# Patient Record
(demographics unavailable — no encounter records)

---

## 2024-10-24 NOTE — PROCEDURE
[de-identified] : Ultrasound Guided Injection  Indication for ultrasound guidance: Ensure placement within the knee joint  Utlizing the [employer] portable ultrasound machine, the Linear 6cm 13-6 MHz transducer and sterile ultrasound gel, ultrasound guidance with the probe in the short axis, utilizing an in plane approach, was used for the following injection:   Injection: LEFT knee joint. Indication: Osteoarthritis.  A discussion was had with the patient regarding this procedure and all questions were answered. All risks, benefits and alternatives were discussed. These included but were not limited to bleeding, infection, allergic reaction and reaccumulation of fluid. A timeout was done to ensure correct side and pt agreed to the procedure.  Alcohol was used to clean the skin, and Chlorhexidine was used to sterilize and prep the area in the superolateral joint line aspect of the knee. A 25 -gauge needle was used to inject 3cc of 1% lidocaine without epinephrine.  A 22-gauge needle was used to inject 4cc of 1% lidocaine without epinephrine and 1cc of 40mg/ml methylprednisolone into the knee. A sterile bandage was then applied. The patient tolerated the procedure well.  Injection: RIGHT knee joint. Indication: Osteoarthritis.  A discussion was had with the patient regarding this procedure and all questions were answered. All risks, benefits and alternatives were discussed. These included but were not limited to bleeding, infection, allergic reaction and reaccumulation of fluid. A timeout was done to ensure correct side and pt agreed to the procedure.  Alcohol was used to clean the skin, and Chlorhexidine was used to sterilize and prep the area in the superolateral joint line aspect of the knee. A 25 -gauge needle was used to inject 3cc of 1% lidocaine without epinephrine.  A 22-gauge needle was used to inject 4cc of 1% lidocaine without epinephrine and 1cc of 40mg/ml methylprednisolone into the knee. A sterile bandage was then applied. The patient tolerated the procedure well.

## 2024-10-24 NOTE — PHYSICAL EXAM
[Slightly Antalgic] : slightly antalgic [de-identified] : Constitutional: Well-nourished, well-developed, No acute distress\par  Respiratory:  Good respiratory effort, no SOB\par  Lymphatic: No regional lymphadenopathy, no lymphedema\par  Psychiatric: Pleasant and normal affect, alert and oriented x3\par  Skin: Clean dry and intact B/L UE/LE\par  Musculoskeletal: normal except where as noted in regional exam\par  \par  Bilateral Knees:\par  APPEARANCE: no marked deformities, no swelling or malalignment\par  POSITIVE TENDERNESS:  medial joint line. \par  ROM: full & pain with flexion\par  SPECIAL TESTS: stable v/v stress. painless grind. neg Lachman's. neg ant/post drawer. pos Rosalva's. \par  NEURO: Normal sensation of LE, DTRs 2+/4 patella and achilles\par  PULSES: 2+ DP/PT pulses\par  B/L Hips: No asymmetry, malalignment, or swelling, Full ROM, 5/5 strength in flexion/ext, IR/ER, Abd/Add, Joints stable\par  B/L Ankles: No asymmetry, malalignment, or swelling, Full ROM, 5/5 strength in DF/PF/Inv/Ev, Joints stable

## 2024-10-24 NOTE — PHYSICAL EXAM
[Slightly Antalgic] : slightly antalgic [de-identified] : Constitutional: Well-nourished, well-developed, No acute distress\par  Respiratory:  Good respiratory effort, no SOB\par  Lymphatic: No regional lymphadenopathy, no lymphedema\par  Psychiatric: Pleasant and normal affect, alert and oriented x3\par  Skin: Clean dry and intact B/L UE/LE\par  Musculoskeletal: normal except where as noted in regional exam\par  \par  Bilateral Knees:\par  APPEARANCE: no marked deformities, no swelling or malalignment\par  POSITIVE TENDERNESS:  medial joint line. \par  ROM: full & pain with flexion\par  SPECIAL TESTS: stable v/v stress. painless grind. neg Lachman's. neg ant/post drawer. pos Rosalva's. \par  NEURO: Normal sensation of LE, DTRs 2+/4 patella and achilles\par  PULSES: 2+ DP/PT pulses\par  B/L Hips: No asymmetry, malalignment, or swelling, Full ROM, 5/5 strength in flexion/ext, IR/ER, Abd/Add, Joints stable\par  B/L Ankles: No asymmetry, malalignment, or swelling, Full ROM, 5/5 strength in DF/PF/Inv/Ev, Joints stable

## 2024-10-24 NOTE — HISTORY OF PRESENT ILLNESS
[de-identified] : JEAN MOORE  is a 56 year old M who presents for follow up visit with bilateral knee pain. Patient was last seen November 2023, at which time he had bilateral knee zilretta injections. Patient stated that the Zilretta injections helped for at least 4 months and then the pain slowly returned. Pain is primarily located at the anterior knees. It began in 2019, without injury or trauma.  Pain is described as sharp and achy in nature, worse with getting up from kneeing. Endorses occasional buckling.  Patient had bilateral steroid injections July 2023 with good relief for about 3 months.  No prior injury or recent injury. Denies numbness, tingling, weakness of the lower extremities.

## 2024-10-24 NOTE — DISCUSSION/SUMMARY
[de-identified] : Leoncio presents for follow up of bilateral osteoarthritis. Ultrasound guided bilateral cortisone injections performed today. Patient tolerated the procedure well. I informed the patient that the numbing medicine in today's injection will last for about 4-6 hours. The steroid that was injected will start to work in 1 to 2 days, peak at 1-2 weeks, and may last up to 4-6 weeks.  Pain may worsen over the next 72 hours. Patient was also informed they may notice local skin depigmentation, which is normal.   Zilretta injection for bilateral knees ordered today. Patient will follow up for this once approved.   Sweta Huerta MD, [facility] Department of Orthopedics   I Iam Prince ATC, assisted in the history and documentation of the patient with Dr Sweta Huerta on 10/22/2024 ISweta MD, [facility], personally performed the services described in the documentation, reviewed the documentation recorded by the scribe in my presence and it accurately and completely records my words and actions.

## 2024-10-24 NOTE — DISCUSSION/SUMMARY
[de-identified] : Leoncio presents for follow up of bilateral osteoarthritis. Ultrasound guided bilateral cortisone injections performed today. Patient tolerated the procedure well. I informed the patient that the numbing medicine in today's injection will last for about 4-6 hours. The steroid that was injected will start to work in 1 to 2 days, peak at 1-2 weeks, and may last up to 4-6 weeks.  Pain may worsen over the next 72 hours. Patient was also informed they may notice local skin depigmentation, which is normal.   Zilretta injection for bilateral knees ordered today. Patient will follow up for this once approved.   Sweta Huerta MD, [facility] Department of Orthopedics   I Iam Prince ATC, assisted in the history and documentation of the patient with Dr Sweta Huerta on 10/22/2024 ISweta MD, [facility], personally performed the services described in the documentation, reviewed the documentation recorded by the scribe in my presence and it accurately and completely records my words and actions.

## 2024-10-24 NOTE — PROCEDURE
[de-identified] : Ultrasound Guided Injection  Indication for ultrasound guidance: Ensure placement within the knee joint  Utlizing the [employer] portable ultrasound machine, the Linear 6cm 13-6 MHz transducer and sterile ultrasound gel, ultrasound guidance with the probe in the short axis, utilizing an in plane approach, was used for the following injection:   Injection: LEFT knee joint. Indication: Osteoarthritis.  A discussion was had with the patient regarding this procedure and all questions were answered. All risks, benefits and alternatives were discussed. These included but were not limited to bleeding, infection, allergic reaction and reaccumulation of fluid. A timeout was done to ensure correct side and pt agreed to the procedure.  Alcohol was used to clean the skin, and Chlorhexidine was used to sterilize and prep the area in the superolateral joint line aspect of the knee. A 25 -gauge needle was used to inject 3cc of 1% lidocaine without epinephrine.  A 22-gauge needle was used to inject 4cc of 1% lidocaine without epinephrine and 1cc of 40mg/ml methylprednisolone into the knee. A sterile bandage was then applied. The patient tolerated the procedure well.  Injection: RIGHT knee joint. Indication: Osteoarthritis.  A discussion was had with the patient regarding this procedure and all questions were answered. All risks, benefits and alternatives were discussed. These included but were not limited to bleeding, infection, allergic reaction and reaccumulation of fluid. A timeout was done to ensure correct side and pt agreed to the procedure.  Alcohol was used to clean the skin, and Chlorhexidine was used to sterilize and prep the area in the superolateral joint line aspect of the knee. A 25 -gauge needle was used to inject 3cc of 1% lidocaine without epinephrine.  A 22-gauge needle was used to inject 4cc of 1% lidocaine without epinephrine and 1cc of 40mg/ml methylprednisolone into the knee. A sterile bandage was then applied. The patient tolerated the procedure well.

## 2024-10-24 NOTE — HISTORY OF PRESENT ILLNESS
[de-identified] : JEAN MOORE  is a 56 year old M who presents for follow up visit with bilateral knee pain. Patient was last seen November 2023, at which time he had bilateral knee zilretta injections. Patient stated that the Zilretta injections helped for at least 4 months and then the pain slowly returned. Pain is primarily located at the anterior knees. It began in 2019, without injury or trauma.  Pain is described as sharp and achy in nature, worse with getting up from kneeing. Endorses occasional buckling.  Patient had bilateral steroid injections July 2023 with good relief for about 3 months.  No prior injury or recent injury. Denies numbness, tingling, weakness of the lower extremities.

## 2025-03-24 NOTE — PHYSICAL EXAM
[Normal Appearance] : normal appearance [] : no respiratory distress [Prostate Tenderness] : the prostate was not tender [No Prostate Nodules] : no prostate nodules [Prostate Size ___ gm] : prostate size [unfilled] gm [Normal Station and Gait] : the gait and station were normal for the patient's age [Oriented To Time, Place, And Person] : oriented to person, place, and time

## 2025-03-25 NOTE — HISTORY OF PRESENT ILLNESS
[de-identified] : 56 year old male with hx of hepatic steatosis and chronic LLQ pain presenting for a CPE. He states that he has has been having a discomfort after he urinates, but denies pain and burning. He states that the frequency has gone up, with him having to wake up in the middle of the night to urinate. Denies fever, chills, chest pain, SOB. No change in color of his urine and without burning or blood. He mentions that he has saw urology in the past but is unaware if he should be taking any medications. Currently he does not take anything. He states that he tends to overthink a lot and has trouble sleeping. Denies SI/HI but would like to talk to someone about his stressors. He also has been dealing with chronic knee pain which he follows with ortho.

## 2025-03-25 NOTE — ASSESSMENT
[FreeTextEntry1] : 56 year old male with hx of hepatic steatosis and chronic LLQ pain presenting for a CPE.   #hepatic steatosis  Diagnosed in 2014. Has been evaluated at that time due to elevated LFTs. Imaging demonstrated fatty liver. Since then, he has lost weight.  - will check CMP for liver function tests, has been negative recently  - encouraged weight loss and increasing exercise  - will send basic workup and U/S repeat   #dysuria  Was on flomax for BPH but has ? since been discontinued by patient.  - f/u urology  - urine studies to r/o UTI   #medial meniscal tear  Follows with ortho. Received steroid injections but hyaluronic acid injections denied by insurance.  - to follow-up   #HCM  - dental referral  - routine labs   Case discussed with Dr. Robbins. RTC in 3 months.

## 2025-03-25 NOTE — HEALTH RISK ASSESSMENT
[Good] : ~his/her~  mood as  good [Yes] : Yes [Monthly or less (1 pt)] : Monthly or less (1 point) [1 or 2 (0 pts)] : 1 or 2 (0 points) [No falls in past year] : Patient reported no falls in the past year [Never (0 pts)] : Never (0 points) [Little interest or pleasure doing things] : 1) Little interest or pleasure doing things [Feeling down, depressed, or hopeless] : 2) Feeling down, depressed, or hopeless [2] : 2) Feeling down, depressed, or hopeless for more than half of the days (2) [PHQ-2 Positive] : PHQ-2 Positive [PHQ-9 Positive] : PHQ-9 Positive [Never] : Never [0-4] : 0-4 [NO] : No [Financial] : financial [With Family] : lives with family [Employed] : employed [] :  [Sexually Active] : sexually active [Fully functional (bathing, dressing, toileting, transferring, walking, feeding)] : Fully functional (bathing, dressing, toileting, transferring, walking, feeding) [Feels Safe at Home] : Feels safe at home [Fully functional (using the telephone, shopping, preparing meals, housekeeping, doing laundry, using] : Fully functional and needs no help or supervision to perform IADLs (using the telephone, shopping, preparing meals, housekeeping, doing laundry, using transportation, managing medications and managing finances) [Safety elements used in home] : safety elements used in home [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, works in construction  [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, typical cultural foods, no fast food or sugary beverages  [[id number]] : 4 [Change in mental status noted] : No change in mental status noted [Language] : denies difficulty with language [Behavior] : denies difficulty with behavior [Learning/Retaining New Information] : denies difficulty learning/retaining new information [Handling Complex Tasks] : denies difficulty handling complex tasks [Reasoning] : denies difficulty with reasoning [Spatial Ability and Orientation] : denies difficulty with spatial ability and orientation [High Risk Behavior] : no high risk behavior [Reports changes in hearing] : Reports no changes in hearing [Reports changes in vision] : Reports no changes in vision [Reports changes in dental health] : Reports no changes in dental health

## 2025-03-25 NOTE — HEALTH RISK ASSESSMENT
[Good] : ~his/her~  mood as  good [Yes] : Yes [Monthly or less (1 pt)] : Monthly or less (1 point) [1 or 2 (0 pts)] : 1 or 2 (0 points) [Never (0 pts)] : Never (0 points) [No falls in past year] : Patient reported no falls in the past year [Little interest or pleasure doing things] : 1) Little interest or pleasure doing things [Feeling down, depressed, or hopeless] : 2) Feeling down, depressed, or hopeless [2] : 2) Feeling down, depressed, or hopeless for more than half of the days (2) [PHQ-2 Positive] : PHQ-2 Positive [PHQ-9 Positive] : PHQ-9 Positive [Never] : Never [0-4] : 0-4 [NO] : No [Financial] : financial [With Family] : lives with family [Employed] : employed [] :  [Sexually Active] : sexually active [Feels Safe at Home] : Feels safe at home [Fully functional (bathing, dressing, toileting, transferring, walking, feeding)] : Fully functional (bathing, dressing, toileting, transferring, walking, feeding) [Fully functional (using the telephone, shopping, preparing meals, housekeeping, doing laundry, using] : Fully functional and needs no help or supervision to perform IADLs (using the telephone, shopping, preparing meals, housekeeping, doing laundry, using transportation, managing medications and managing finances) [Safety elements used in home] : safety elements used in home [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, works in construction  [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, typical cultural foods, no fast food or sugary beverages  [[id number]] : 4 [Change in mental status noted] : No change in mental status noted [Language] : denies difficulty with language [Behavior] : denies difficulty with behavior [Learning/Retaining New Information] : denies difficulty learning/retaining new information [Handling Complex Tasks] : denies difficulty handling complex tasks [Reasoning] : denies difficulty with reasoning [Spatial Ability and Orientation] : denies difficulty with spatial ability and orientation [High Risk Behavior] : no high risk behavior [Reports changes in hearing] : Reports no changes in hearing [Reports changes in vision] : Reports no changes in vision [Reports changes in dental health] : Reports no changes in dental health

## 2025-03-25 NOTE — HISTORY OF PRESENT ILLNESS
[de-identified] : 56 year old male with hx of hepatic steatosis and chronic LLQ pain presenting for a CPE. He states that he has has been having a discomfort after he urinates, but denies pain and burning. He states that the frequency has gone up, with him having to wake up in the middle of the night to urinate. Denies fever, chills, chest pain, SOB. No change in color of his urine and without burning or blood. He mentions that he has saw urology in the past but is unaware if he should be taking any medications. Currently he does not take anything. He states that he tends to overthink a lot and has trouble sleeping. Denies SI/HI but would like to talk to someone about his stressors. He also has been dealing with chronic knee pain which he follows with ortho.

## 2025-03-25 NOTE — HEALTH RISK ASSESSMENT
[Good] : ~his/her~  mood as  good [Yes] : Yes [Monthly or less (1 pt)] : Monthly or less (1 point) [1 or 2 (0 pts)] : 1 or 2 (0 points) [Never (0 pts)] : Never (0 points) [No falls in past year] : Patient reported no falls in the past year [Little interest or pleasure doing things] : 1) Little interest or pleasure doing things [Feeling down, depressed, or hopeless] : 2) Feeling down, depressed, or hopeless [2] : 2) Feeling down, depressed, or hopeless for more than half of the days (2) [PHQ-2 Positive] : PHQ-2 Positive [PHQ-9 Positive] : PHQ-9 Positive [Never] : Never [0-4] : 0-4 [NO] : No [Financial] : financial [With Family] : lives with family [Employed] : employed [] :  [Sexually Active] : sexually active [Fully functional (bathing, dressing, toileting, transferring, walking, feeding)] : Fully functional (bathing, dressing, toileting, transferring, walking, feeding) [Feels Safe at Home] : Feels safe at home [Fully functional (using the telephone, shopping, preparing meals, housekeeping, doing laundry, using] : Fully functional and needs no help or supervision to perform IADLs (using the telephone, shopping, preparing meals, housekeeping, doing laundry, using transportation, managing medications and managing finances) [Safety elements used in home] : safety elements used in home [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, works in construction  [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, typical cultural foods, no fast food or sugary beverages  [[id number]] : 4 [Change in mental status noted] : No change in mental status noted [Language] : denies difficulty with language [Behavior] : denies difficulty with behavior [Learning/Retaining New Information] : denies difficulty learning/retaining new information [Handling Complex Tasks] : denies difficulty handling complex tasks [Reasoning] : denies difficulty with reasoning [Spatial Ability and Orientation] : denies difficulty with spatial ability and orientation [High Risk Behavior] : no high risk behavior [Reports changes in hearing] : Reports no changes in hearing [Reports changes in vision] : Reports no changes in vision [Reports changes in dental health] : Reports no changes in dental health

## 2025-03-25 NOTE — HISTORY OF PRESENT ILLNESS
[de-identified] : 56 year old male with hx of hepatic steatosis and chronic LLQ pain presenting for a CPE. He states that he has has been having a discomfort after he urinates, but denies pain and burning. He states that the frequency has gone up, with him having to wake up in the middle of the night to urinate. Denies fever, chills, chest pain, SOB. No change in color of his urine and without burning or blood. He mentions that he has saw urology in the past but is unaware if he should be taking any medications. Currently he does not take anything. He states that he tends to overthink a lot and has trouble sleeping. Denies SI/HI but would like to talk to someone about his stressors. He also has been dealing with chronic knee pain which he follows with ortho.

## 2025-03-25 NOTE — HISTORY OF PRESENT ILLNESS
[FreeTextEntry1] : 56 year old male presents to the office for a c/o nocturia and tingling sensation after urination. He states that the tingling sensation happens when he holds his urine but urinates well otherwise Denies frequency, gross hematuria, dysuria, straining, dribbling flank pain  LUTS Nocturia x1   Patient reports that erections are not fully rigid

## 2025-05-07 NOTE — HISTORY OF PRESENT ILLNESS
[FreeTextEntry1] : Patient returns to discuss his hormonal laboratory studiesHe is doing well on the tadalafil without problems with it.  He feels it is effective.

## 2025-05-13 NOTE — PHYSICAL EXAM
[Normal Appearance] : normal appearance [] : no respiratory distress [Normal Station and Gait] : the gait and station were normal for the patient's age [Oriented To Time, Place, And Person] : oriented to person, place, and time [TextEntry] : perimetry negative

## 2025-05-21 NOTE — HISTORY OF PRESENT ILLNESS
[FreeTextEntry1] : Patient returns to the officeIt was also elevated on repeat.  He has an MRI which showed a small hypodensity near the pituitary stalk.  There are no lesions that would appear to impinge upon the optic nerve.  He was given referral for the endocrinologist at last visit but was told that it was booked out till December. for elevated prolactin 19.3

## 2025-05-21 NOTE — PHYSICAL EXAM
[General Appearance - Well Developed] : well developed [General Appearance - Well Nourished] : well nourished [No Focal Deficits] : no focal deficits

## 2025-06-01 NOTE — INTERPRETER SERVICES
[Patient Declined  Services] : - None: Patient declined  services [Interpreters_Relationshiptopatient] : He prefers use English by self. [TWNoteComboBox1] : Slovenian

## 2025-06-01 NOTE — REVIEW OF SYSTEMS
[FreeTextEntry2] : Constitutional:  no fever and no chills.  Eyes:  no discharge.  HEENT:  no earache.  Cardiovascular:  no chest pain, no palpitations and no lower extremity edema.  Respiratory:  no shortness of breath, no wheezing and no cough.  Gastrointestinal:  no abdominal pain, no nausea and no vomiting.  Genitourinary:  no dysuria.  Musculoskeletal:  no joint pain.  Integumentary:  no itching.  Neurological:  no headache.  Psychiatric:  not suicidal.  Hematologic/Lymphatic:  no easy bleeding. [FreeTextEntry3] : see hpi

## 2025-06-01 NOTE — ASSESSMENT
[FreeTextEntry1] : Mr. JEAN MOORE is a 57 year old Other race  male  with history of hepatic steatosis and chronic LLQ pain  presented today for " something in the eyes".  # left upper eyelid hordeolum Instructed pt not to pop the stye/hordeolum. Encouraged Apply frequent warm compression to the eye. Explained no medical indication for ABT ointment, but he was adamant to get ABT eye medications. Sent Rx for erythromycin ophthal ointment qid for 7days.  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 JOSHUA MUIR,CHERYL GROSSMAN   ophthal.  # Increased prolactin and abnormal brain MRI -serum prolactin : 23.8 on 3/25/25 ---> 19.3 5/5/25. ref ( 4.1-18.4). Explained pt that prolactin increase was mild and down trending. Pt denies sx of gynecomastia or male ED. -Reviewed MR pituitary W/WO cont on 5/20/25 by uro: Evidence of a 2 mm hypoenhancing nodule along the anterior margin of the infundibulum; a finding compatible with adenoma in the appropriate clinical-laboratory setting. No pituitary enlargement or suprasellar abnormality. -Gave pt [facility]  Patient Access Services. TEL) [phone] [facility]  RTO with our clinic prn.

## 2025-06-01 NOTE — PHYSICAL EXAM
[Normal Sclera/Conjunctiva] : normal sclera/conjunctiva [PERRL] : pupils equal round and reactive to light [EOMI] : extraocular movements intact [de-identified] : WDWN in NAD HEENT:  unremarkable Neck:  supple, no JVD, no LN Lungs:  CTA B/L, no W/R/R Heart:  Reg rate, +S1S2, no M/R/G Abdomen:  soft, NT, ND, +BS, no masses, no HS-megaly Genital: No pubic or genital lesions noted. Ext:  no C/C/E Neuro:  no focal deficits [de-identified] : Rt upper eyelid small sized hordeolum noted with mild erythema

## 2025-06-01 NOTE — PHYSICAL EXAM
[PERRL] : pupils equal round and reactive to light [Normal Sclera/Conjunctiva] : normal sclera/conjunctiva [EOMI] : extraocular movements intact [de-identified] : WDWN in NAD HEENT:  unremarkable Neck:  supple, no JVD, no LN Lungs:  CTA B/L, no W/R/R Heart:  Reg rate, +S1S2, no M/R/G Abdomen:  soft, NT, ND, +BS, no masses, no HS-megaly Genital: No pubic or genital lesions noted. Ext:  no C/C/E Neuro:  no focal deficits [de-identified] : Rt upper eyelid small sized hordeolum noted with mild erythema

## 2025-06-01 NOTE — ASSESSMENT
[FreeTextEntry1] : Mr. JEAN MOORE is a 57 year old Other race  male  with history of hepatic steatosis and chronic LLQ pain  presented today for " something in the eyes".  # left upper eyelid hordeolum Instructed pt not to pop the stye/hordeolum. Encouraged Apply frequent warm compression to the eye. Explained no medical indication for ABT ointment, but he was adamant to get ABT eye medications. Sent Rx for erythromycin ophthal ointment qid for 7days.  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 JOSHUA MUIR,CHERYL GROSSMAN   ophthal.  # Increased prolactin and abnormal brain MRI -serum prolactin : 23.8 on 3/25/25 ---> 19.3 5/5/25. ref ( 4.1-18.4). Explained pt that prolactin increase was mild and down trending. Pt denies sx of gynecomastia or male ED. -Reviewed MR pituitary W/WO cont on 5/20/25 by uro: Evidence of a 2 mm hypoenhancing nodule along the anterior margin of the infundibulum; a finding compatible with adenoma in the appropriate clinical-laboratory setting. No pituitary enlargement or suprasellar abnormality. -Gave pt [facility]  Patient Access Services. TEL) [phone] I sent task to [facility] regarding urology's referral.  RTO with our clinic prn.

## 2025-06-01 NOTE — HISTORY OF PRESENT ILLNESS
[FreeTextEntry8] : Mr. JEAN MOORE is a 57 year old Other race  male  with history of hepatic steatosis and chronic LLQ pain  presented today for " something in the eyes". Last seen by our clinic 3/20/25.  He shows me his Rt upper eyelid's red bump for a few days. His wife told him he needs eye antibiotics that worked very well to her.  Denies eye discharge, eye pain, sudden vision change. Last seen by [facility] ophthalmology on 3/20/22.  Also he asks for our  to call Endocrinology Dr. Golden office for earlier appoint regarding his brain tumor. Per him, his Urologist Dr. Monahan did some blood work then let him get brain MRI. He was told to see Endo for brain tumor. He has called the Endo office every day, but told the earliest appointment would be December. He expresses anxiety and anger saying, " I might have been dead by December". Denied fever, chills,CP, SOB, abdominal pain, n/v/c/d.

## 2025-06-01 NOTE — INTERPRETER SERVICES
[Patient Declined  Services] : - None: Patient declined  services [Interpreters_Relationshiptopatient] : He prefers use English by self. [TWNoteComboBox1] : Kosovan

## 2025-06-01 NOTE — HISTORY OF PRESENT ILLNESS
[FreeTextEntry8] : Mr. JEAN MOORE is a 57 year old Other race  male  with history of hepatic steatosis and chronic LLQ pain  presented today for " something in the eyes". Last seen by our clinic 3/20/25.  He shows me his Rt upper eyelid's red bump for a few days. His wife told him he needs eye antibiotics that worked very well to her.  Denies eye discharge, eye pain, sudden vision change. Last seen by [facility] [place] ophthalmology on 3/20/22.  Also he asks for our  to call Endocrinology Dr. Golden office for earlier appoint regarding his brain tumor. Per him, his Urologist Dr. Monahan did some blood work then let him get brain MRI. He was told to see Endo for brain tumor. He has called the Endo office every day, but told the earliest appointment would be December. He expresses anxiety and anger saying, " I might have been dead by December". Denied fever, chills,CP, SOB, abdominal pain, n/v/c/d.